# Patient Record
Sex: FEMALE | Race: AMERICAN INDIAN OR ALASKA NATIVE
[De-identification: names, ages, dates, MRNs, and addresses within clinical notes are randomized per-mention and may not be internally consistent; named-entity substitution may affect disease eponyms.]

---

## 2018-07-08 ENCOUNTER — HOSPITAL ENCOUNTER (EMERGENCY)
Dept: HOSPITAL 5 - ED | Age: 43
Discharge: HOME | End: 2018-07-08
Payer: COMMERCIAL

## 2018-07-08 VITALS — DIASTOLIC BLOOD PRESSURE: 78 MMHG | SYSTOLIC BLOOD PRESSURE: 142 MMHG
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
## 2018-07-08 DIAGNOSIS — S13.4XXA: Primary | ICD-10-CM

## 2018-07-08 DIAGNOSIS — S39.012A: ICD-10-CM
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
## 2018-07-08 DIAGNOSIS — S76.012A: ICD-10-CM

## 2018-07-08 PROCEDURE — 99283 EMERGENCY DEPT VISIT LOW MDM: CPT

## 2018-07-08 PROCEDURE — 72100 X-RAY EXAM L-S SPINE 2/3 VWS: CPT

## 2018-07-08 PROCEDURE — 72040 X-RAY EXAM NECK SPINE 2-3 VW: CPT
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
## 2018-07-08 NOTE — XRAY REPORT
FINAL REPORT



EXAM:  XR HIP 2-3V LT



HISTORY:  left hip pain 



TECHNIQUE:  AP pelvis and lateral view left hip



Comparison: None



FINDINGS:  

Normal bony mineralization.



Widening of the pubic symphysis.



Femoral head is normally located. No fracture or dislocation of

the femoral neck identified.



Sacral arches are intact.



Femoral head is spherical.



IMPRESSION:  

Suboptimal bony technique. Widened pubic symphysis.



No hip fracture identified. 



If there is concern for acute trauma, recommend MRI pelvis.

## 2018-07-08 NOTE — XRAY REPORT
FINAL REPORT



EXAM:  XR SPINE CERVICAL 2-3V



HISTORY:  neck pain 



TECHNIQUE:  Four view cervical spine were performed



Comparison: None



FINDINGS:  

There is straightening of the normal cervical lordosis.



The lower cervical spine at the cervicothoracic junction is

poorly imaged despite swimmer's view. There appears to be

calcification of the anterior disc at C6/C7.



Spinal laminar line is intact.



There is no significant uncovertebral hypertrophy.



Imaged lung apices are clear.



C1 lateral masses align normally on C2. The base of the odontoid

is intact.



IMPRESSION:  

Straightened normal cervical lordosis with incomplete

visualization of the cervicothoracic junction.



Etiology for the loss of cervical lordosis is unknown, consider

muscular spasm.



No definite fracture or subluxation.
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
